# Patient Record
Sex: FEMALE | Race: WHITE | Employment: FULL TIME | URBAN - METROPOLITAN AREA
[De-identification: names, ages, dates, MRNs, and addresses within clinical notes are randomized per-mention and may not be internally consistent; named-entity substitution may affect disease eponyms.]

---

## 2017-06-08 ENCOUNTER — HOSPITAL ENCOUNTER (EMERGENCY)
Facility: HOSPITAL | Age: 32
Discharge: HOME | End: 2017-06-08
Attending: EMERGENCY MEDICINE
Payer: COMMERCIAL

## 2017-06-08 ENCOUNTER — APPOINTMENT (OUTPATIENT)
Dept: ULTRASOUND IMAGING | Facility: HOSPITAL | Age: 32
End: 2017-06-08
Attending: EMERGENCY MEDICINE
Payer: COMMERCIAL

## 2017-06-08 VITALS
HEART RATE: 88 BPM | SYSTOLIC BLOOD PRESSURE: 118 MMHG | TEMPERATURE: 98 F | BODY MASS INDEX: 19.97 KG/M2 | RESPIRATION RATE: 18 BRPM | HEIGHT: 64 IN | WEIGHT: 117 LBS | DIASTOLIC BLOOD PRESSURE: 76 MMHG | OXYGEN SATURATION: 100 %

## 2017-06-08 DIAGNOSIS — R60.0 EXTREMITY EDEMA: Primary | ICD-10-CM

## 2017-06-08 PROCEDURE — 99284 EMERGENCY DEPT VISIT MOD MDM: CPT

## 2017-06-08 PROCEDURE — 93971 EXTREMITY STUDY: CPT | Performed by: EMERGENCY MEDICINE

## 2017-06-08 RX ORDER — IBUPROFEN 200 MG
200 TABLET ORAL EVERY 6 HOURS PRN
COMMUNITY
End: 2020-03-02 | Stop reason: ALTCHOICE

## 2017-06-08 NOTE — ED NOTES
Patient back in room, patient in stable condition, provided with warm blanket. Sig other at bedside.

## 2017-06-08 NOTE — ED INITIAL ASSESSMENT (HPI)
Patient sent by her OB/GYNE to r/o DVT to right leg. Reports swelling to right ankle x1 month as well as aching pain to knee. Reports intermittent cramping to left calf. Reports worsening aching pain with standing.  Denies injury/ trauma

## 2017-06-08 NOTE — ED PROVIDER NOTES
Patient Seen in: [facility] Emergency Department    History   Patient presents with:  Lower Extremity Injury (musculoskeletal)    Stated Complaint: r/o DVT, sent by gyne    MARJORIE    This is a 28-year-old female coming with complaints of leg swelling. SpO2 100%  LMP 06/07/2017 (Exact Date)        Physical Exam  Generally the patient is alert and oriented ×3 and appears in no distress  HEENT exam normal  Lungs are clear to auscultation bilaterally with no wheezes retractions or rhonchi noted  Cardiovascu

## 2018-05-05 ENCOUNTER — HOSPITAL ENCOUNTER (EMERGENCY)
Facility: HOSPITAL | Age: 33
Discharge: HOME | End: 2018-05-05
Attending: EMERGENCY MEDICINE
Payer: COMMERCIAL

## 2018-05-05 VITALS
DIASTOLIC BLOOD PRESSURE: 91 MMHG | RESPIRATION RATE: 18 BRPM | HEART RATE: 84 BPM | TEMPERATURE: 98 F | WEIGHT: 125 LBS | SYSTOLIC BLOOD PRESSURE: 121 MMHG | HEIGHT: 64 IN | BODY MASS INDEX: 21.34 KG/M2

## 2018-05-05 DIAGNOSIS — S05.01XA ABRASION OF RIGHT CORNEA, INITIAL ENCOUNTER: Primary | ICD-10-CM

## 2018-05-05 PROCEDURE — 99283 EMERGENCY DEPT VISIT LOW MDM: CPT

## 2018-05-05 RX ORDER — TRAMADOL HYDROCHLORIDE 50 MG/1
TABLET ORAL EVERY 4 HOURS PRN
Qty: 10 TABLET | Refills: 0 | Status: SHIPPED | OUTPATIENT
Start: 2018-05-05 | End: 2018-05-12

## 2018-05-05 RX ORDER — TETRACAINE HYDROCHLORIDE 5 MG/ML
2 SOLUTION OPHTHALMIC ONCE
Status: COMPLETED | OUTPATIENT
Start: 2018-05-05 | End: 2018-05-05

## 2018-05-05 RX ORDER — CIPROFLOXACIN HYDROCHLORIDE 3.5 MG/ML
2 SOLUTION/ DROPS TOPICAL
Qty: 1 BOTTLE | Refills: 0 | Status: SHIPPED | OUTPATIENT
Start: 2018-05-05 | End: 2018-05-15

## 2018-05-06 NOTE — ED PROVIDER NOTES
Patient Seen in: [facility] Emergency Department    History   Patient presents with: Eye Visual Problem (opthalmic)    Stated Complaint: eye pain    HPI    59-year-old female presents for evaluation of right eye pain, redness and tearing.   All symp ED Course as of May 05 1922  ------------------------------------------------------------      MDM       After topical tetracaine, patient stated her visual acuity was normal.  She felt much better. Corneal abrasion seen at 12:00.   This is likely related

## 2020-03-02 ENCOUNTER — HOSPITAL ENCOUNTER (OUTPATIENT)
Dept: MAMMOGRAPHY | Facility: HOSPITAL | Age: 35
Discharge: HOME | End: 2020-03-02
Attending: OBSTETRICS & GYNECOLOGY
Payer: COMMERCIAL

## 2020-03-02 ENCOUNTER — OFFICE VISIT (OUTPATIENT)
Dept: FAMILY MEDICINE CLINIC | Facility: CLINIC | Age: 35
End: 2020-03-02
Payer: COMMERCIAL

## 2020-03-02 VITALS
HEIGHT: 63 IN | SYSTOLIC BLOOD PRESSURE: 106 MMHG | HEART RATE: 105 BPM | OXYGEN SATURATION: 97 % | BODY MASS INDEX: 22.5 KG/M2 | TEMPERATURE: 97 F | WEIGHT: 127 LBS | RESPIRATION RATE: 16 BRPM | DIASTOLIC BLOOD PRESSURE: 62 MMHG

## 2020-03-02 DIAGNOSIS — Z00.00 LABORATORY EXAMINATION ORDERED AS PART OF A ROUTINE GENERAL MEDICAL EXAMINATION: ICD-10-CM

## 2020-03-02 DIAGNOSIS — Z00.00 PHYSICAL EXAM, ANNUAL: Primary | ICD-10-CM

## 2020-03-02 DIAGNOSIS — Z13.89 SCREENING FOR GENITOURINARY CONDITION: ICD-10-CM

## 2020-03-02 DIAGNOSIS — Z80.3 FAMILY HISTORY OF MALIGNANT NEOPLASM OF BREAST: ICD-10-CM

## 2020-03-02 DIAGNOSIS — R00.0 TACHYCARDIA: ICD-10-CM

## 2020-03-02 PROCEDURE — 77066 DX MAMMO INCL CAD BI: CPT | Performed by: OBSTETRICS & GYNECOLOGY

## 2020-03-02 PROCEDURE — 99385 PREV VISIT NEW AGE 18-39: CPT | Performed by: FAMILY MEDICINE

## 2020-03-02 PROCEDURE — 77062 BREAST TOMOSYNTHESIS BI: CPT | Performed by: OBSTETRICS & GYNECOLOGY

## 2020-03-02 NOTE — PROGRESS NOTES
HPI:   Naz Arellano is a 28year old female who presents for a complete physical exam. Symptoms: denies discharge, itching, burning or dysuria. Patient complains of some back problems related to her scoliosis.   Since her last visit in 2012 she had 2 pr yes Children: 3. Exercise: three times per week.   Diet: watches calories closely     REVIEW OF SYSTEMS:   GENERAL: feels well otherwise  SKIN: denies any unusual skin lesions  EYES:denies blurred vision or double vision  HEENT: denies nasal congestion, s CBC W/DIFF      COMP METABOLIC PANEL      LIPID PANEL      TSH W REFLEX TO FREE T4      UA/M with Culture Reflex      Magnesium [E]      Meds & Refills for this Visit:  Requested Prescriptions      No prescriptions requested or ordered in this encounter

## 2020-03-02 NOTE — PATIENT INSTRUCTIONS
Healthy diet. Stay active. Return for fasting blood work. Do some back stretching exercise if that would not work return for reevaluation.

## 2024-05-11 ENCOUNTER — APPOINTMENT (OUTPATIENT)
Dept: ULTRASOUND IMAGING | Facility: HOSPITAL | Age: 39
End: 2024-05-11
Attending: EMERGENCY MEDICINE

## 2024-05-11 ENCOUNTER — HOSPITAL ENCOUNTER (EMERGENCY)
Facility: HOSPITAL | Age: 39
Discharge: HOME | End: 2024-05-11
Attending: EMERGENCY MEDICINE

## 2024-05-11 VITALS
HEART RATE: 85 BPM | BODY MASS INDEX: 22.02 KG/M2 | HEIGHT: 64 IN | SYSTOLIC BLOOD PRESSURE: 123 MMHG | WEIGHT: 129 LBS | RESPIRATION RATE: 18 BRPM | TEMPERATURE: 98 F | DIASTOLIC BLOOD PRESSURE: 83 MMHG | OXYGEN SATURATION: 100 %

## 2024-05-11 DIAGNOSIS — N93.8 DYSFUNCTIONAL UTERINE BLEEDING: Primary | ICD-10-CM

## 2024-05-11 LAB
ANTIBODY SCREEN: NEGATIVE
B-HCG UR QL: NEGATIVE
BASOPHILS # BLD AUTO: 0.04 X10(3) UL (ref 0–0.2)
BASOPHILS NFR BLD AUTO: 0.8 %
EOSINOPHIL # BLD AUTO: 0.16 X10(3) UL (ref 0–0.7)
EOSINOPHIL NFR BLD AUTO: 3.3 %
ERYTHROCYTE [DISTWIDTH] IN BLOOD BY AUTOMATED COUNT: 12.9 %
HCT VFR BLD AUTO: 39.9 %
HGB BLD-MCNC: 12.7 G/DL
IMM GRANULOCYTES # BLD AUTO: 0.01 X10(3) UL (ref 0–1)
IMM GRANULOCYTES NFR BLD: 0.2 %
LYMPHOCYTES # BLD AUTO: 1.76 X10(3) UL (ref 1–4)
LYMPHOCYTES NFR BLD AUTO: 35.8 %
MCH RBC QN AUTO: 26.8 PG (ref 26–34)
MCHC RBC AUTO-ENTMCNC: 31.8 G/DL (ref 31–37)
MCV RBC AUTO: 84.2 FL
MONOCYTES # BLD AUTO: 0.4 X10(3) UL (ref 0.1–1)
MONOCYTES NFR BLD AUTO: 8.1 %
NEUTROPHILS # BLD AUTO: 2.55 X10 (3) UL (ref 1.5–7.7)
NEUTROPHILS # BLD AUTO: 2.55 X10(3) UL (ref 1.5–7.7)
NEUTROPHILS NFR BLD AUTO: 51.8 %
PLATELET # BLD AUTO: 310 10(3)UL (ref 150–450)
RBC # BLD AUTO: 4.74 X10(6)UL
RH BLOOD TYPE: POSITIVE
RH BLOOD TYPE: POSITIVE
WBC # BLD AUTO: 4.9 X10(3) UL (ref 4–11)

## 2024-05-11 PROCEDURE — 86900 BLOOD TYPING SEROLOGIC ABO: CPT | Performed by: EMERGENCY MEDICINE

## 2024-05-11 PROCEDURE — 85025 COMPLETE CBC W/AUTO DIFF WBC: CPT | Performed by: EMERGENCY MEDICINE

## 2024-05-11 PROCEDURE — 86850 RBC ANTIBODY SCREEN: CPT | Performed by: EMERGENCY MEDICINE

## 2024-05-11 PROCEDURE — 99285 EMERGENCY DEPT VISIT HI MDM: CPT

## 2024-05-11 PROCEDURE — 36415 COLL VENOUS BLD VENIPUNCTURE: CPT

## 2024-05-11 PROCEDURE — 86901 BLOOD TYPING SEROLOGIC RH(D): CPT | Performed by: EMERGENCY MEDICINE

## 2024-05-11 PROCEDURE — 81025 URINE PREGNANCY TEST: CPT

## 2024-05-11 PROCEDURE — 76856 US EXAM PELVIC COMPLETE: CPT | Performed by: EMERGENCY MEDICINE

## 2024-05-11 PROCEDURE — 93975 VASCULAR STUDY: CPT | Performed by: EMERGENCY MEDICINE

## 2024-05-11 PROCEDURE — 76830 TRANSVAGINAL US NON-OB: CPT | Performed by: EMERGENCY MEDICINE

## 2024-05-11 RX ORDER — PROGESTERONE 200 MG/1
200 CAPSULE ORAL NIGHTLY
Qty: 14 CAPSULE | Refills: 0 | Status: SHIPPED | OUTPATIENT
Start: 2024-05-11 | End: 2024-05-25

## 2024-05-11 RX ORDER — SUCRALFATE 1 G/1
1 TABLET ORAL
COMMUNITY

## 2024-05-11 NOTE — ED PROVIDER NOTES
Patient Seen in: [facility] Emergency Department      History     Chief Complaint   Patient presents with    Eval-G     Stated Complaint: heavy vaginal bleeding with clots    Subjective:   HPI    39-year-old with a history of GERD, prior appendectomy presents for evaluation of abnormal uterine bleeding.  Patient started her period , which was expected. However, she has been bleeding ever since. She called her GYN about 2 weeks in, and was prescribed 10 days of progesterone however after she finished the course, her bleeding has now worsened and is heavier with clots. No abdominal pain today, had some menstrual cramping the last few days. No nausea or vomiting. No shortness of breath.   Has paraguard IUD  GYN: Dr. Iron Sibley Sub  Records reviewed.  Had pelvic ultrasound ordered , not yet performed.  CBC with hemoglobin 13.0 on 3/4.  Objective:   Past Medical History:    Esophageal reflux    Scoliosis              Past Surgical History:   Procedure Laterality Date    Adenoidectomy      Appendectomy  90          x2    Tonsillectomy  4                Social History     Socioeconomic History    Marital status:    Tobacco Use    Smoking status: Never    Smokeless tobacco: Never   Vaping Use    Vaping status: Never Used   Substance and Sexual Activity    Alcohol use: Yes     Comment: social, once every 2-3 weeks    Drug use: No    Sexual activity: Yes     Partners: Male   Other Topics Concern    Caffeine Concern No     Comment: 2-3x per wk    Exercise No     Comment: 1 per wk    Seat Belt Yes              Review of Systems    Positive for stated complaint: heavy vaginal bleeding with clots  Other systems are as noted in HPI.  Constitutional and vital signs reviewed.      All other systems reviewed and negative except as noted above.    Physical Exam     ED Triage Vitals [24 1117]   /87   Pulse 96   Resp 18   Temp 98.3 °F (36.8 °C)   Temp src    SpO2 100 %   O2 Device         Current Vitals:   Vital Signs  BP: 120/87  Pulse: 96  Resp: 18  Temp: 98.3 °F (36.8 °C)    Oxygen Therapy  SpO2: 100 %            Physical Exam    General: Patient is awake, alert in no acute distress.   HEENT:  Sclera are not icteric.  Conjunctivae within normal limits.  Mucous members are moist.   Cardiovascular: Regular rate and rhythm, normal S1-S2.  Respiratory: Lungs are clear to auscultation bilaterally.   Abdomen: Soft, nontender, nondistended.  : Normal external female genitalia.  Small amount of dark red blood in the vaginal vault.  Extremities: No edema.  Skin: warm and dry, no diaphoresis    ED Course     Labs Reviewed   CBC WITH DIFFERENTIAL WITH PLATELET    Narrative:     The following orders were created for panel order CBC With Differential With Platelet.  Procedure                               Abnormality         Status                     ---------                               -----------         ------                     CBC W/ DIFFERENTIAL[[id number]]                              Final result                 Please view results for these tests on the individual orders.   TYPE AND SCREEN    Narrative:     The following orders were created for panel order Type and screen.  Procedure                               Abnormality         Status                     ---------                               -----------         ------                     ABORH (Blood Type)[[id number]]                               Final result               Antibody Screen[[id number]]                                  Final result                 Please view results for these tests on the individual orders.   ABORH (BLOOD TYPE)   ANTIBODY SCREEN   ABORH CONFIRMATION   CBC W/ DIFFERENTIAL     Pregnancy test negative  Ultrasound pelvis: I personally reviewed the radiographs and my individual interpretation shows IUD noted within the uterus.  I also reviewed the official report which showed no acute process.  IUD in  place.  2.4 cm left ovarian cyst.                 MDM        Previous records reviewed as noted in HPI    Differential includes, but is not limited to, hemorrhagic shock, ruptured ectopic pregnancy, dysfunctional uterine bleeding    Review of any laboratory testing: CBC with normal hemoglobin, 12.7.     Review of any radiographic studies: Ultrasound unremarkable    Shared decision making with the patient.  Hemoglobin stable.  Ultrasound reassuring.  Patient can be discharged home for close outpatient follow-up with gynecology for more definitive intervention.    Consultants utilized: I spoke with the on-call physician for Dr. Perdue, who recommended prescribing 2 weeks of Prometrium while patient arranges follow-up.    Patient states that she was told they would discuss a possible appointment once her ultrasound was complete.  She did ask for local referrals in case her gynecologist is unable to accommodate her.    Patient should return for new or worsening symptoms such as heavier bleeding, lightheadedness or shortness of breath    OTC meds/Rx meds: Prometrium                                Medical Decision Making      Disposition and Plan     Clinical Impression:  1. Dysfunctional uterine bleeding         Disposition:  Discharge  5/11/2024  4:22 pm    Follow-up:  Bebe Loera MD  [address]  [place] [address]  [phone]    Schedule an appointment as soon as possible for a visit in 1 week(s)      Princess Smith MD  [address] [facility]  [place] [address]  [phone]    Schedule an appointment as soon as possible for a visit in 1 week(s)      Philomena Pollard MD  [address] [facility] [address] [facility] [address]  [place] [address]  [phone]    Schedule an appointment as soon as possible for a visit in 1 week(s)            Medications Prescribed:  Current Discharge Medication List        START taking these medications    Details   progesterone (PROMETRIUM) 200 MG Oral Cap Take 1 capsule  (200 mg total) by mouth nightly for 14 days.  Qty: 14 capsule, Refills: 0

## 2024-05-11 NOTE — DISCHARGE INSTRUCTIONS
Return for new or worsening symptoms such as heavier bleeding, lightheadedness, shortness of breath    If your gynecologist cannot accommodate you, you can try following up with one of the local gynecologists on this paperwork

## 2024-05-11 NOTE — ED INITIAL ASSESSMENT (HPI)
C/o of heavy vaginal bleeding has been on going since last month. With clots. going through 1pad/hr over the last 24hrs.   With intermittent LLQ pain

## 2024-05-23 ENCOUNTER — OFFICE VISIT (OUTPATIENT)
Dept: OBGYN CLINIC | Facility: CLINIC | Age: 39
End: 2024-05-23

## 2024-05-23 VITALS
WEIGHT: 130.06 LBS | DIASTOLIC BLOOD PRESSURE: 79 MMHG | HEIGHT: 64 IN | BODY MASS INDEX: 22.2 KG/M2 | HEART RATE: 97 BPM | SYSTOLIC BLOOD PRESSURE: 116 MMHG

## 2024-05-23 DIAGNOSIS — N92.1 MENORRHAGIA WITH IRREGULAR CYCLE: ICD-10-CM

## 2024-05-23 DIAGNOSIS — N93.8 DUB (DYSFUNCTIONAL UTERINE BLEEDING): Primary | ICD-10-CM

## 2024-05-23 DIAGNOSIS — N92.1 PROLONGED MENSTRUATION: ICD-10-CM

## 2024-05-23 DIAGNOSIS — Z91.89 AT HIGH RISK FOR BREAST CANCER: ICD-10-CM

## 2024-05-23 LAB
CONTROL LINE PRESENT WITH A CLEAR BACKGROUND (YES/NO): YES YES/NO
KIT LOT #: NORMAL NUMERIC
PREGNANCY TEST, URINE: NEGATIVE

## 2024-05-23 PROCEDURE — 3078F DIAST BP <80 MM HG: CPT | Performed by: STUDENT IN AN ORGANIZED HEALTH CARE EDUCATION/TRAINING PROGRAM

## 2024-05-23 PROCEDURE — 3074F SYST BP LT 130 MM HG: CPT | Performed by: STUDENT IN AN ORGANIZED HEALTH CARE EDUCATION/TRAINING PROGRAM

## 2024-05-23 PROCEDURE — 81025 URINE PREGNANCY TEST: CPT | Performed by: STUDENT IN AN ORGANIZED HEALTH CARE EDUCATION/TRAINING PROGRAM

## 2024-05-23 PROCEDURE — 3008F BODY MASS INDEX DOCD: CPT | Performed by: STUDENT IN AN ORGANIZED HEALTH CARE EDUCATION/TRAINING PROGRAM

## 2024-05-23 PROCEDURE — 99204 OFFICE O/P NEW MOD 45 MIN: CPT | Performed by: STUDENT IN AN ORGANIZED HEALTH CARE EDUCATION/TRAINING PROGRAM

## 2024-05-23 RX ORDER — MEDROXYPROGESTERONE ACETATE 10 MG/1
TABLET ORAL
Qty: 65 TABLET | Refills: 0 | Status: SHIPPED | OUTPATIENT
Start: 2024-05-23

## 2024-05-23 NOTE — PROGRESS NOTES
[facility]  Obstetrics and Gynecology   History & Physical    Chief complaint:   Chief Complaint   Patient presents with    New Patient    ER F/U    Vaginal Bleeding        HPI: Lisette Watson is a 39 year old  with Patient's last menstrual period was 2024 (exact date).      Pt normally has regular menses q28d, not too heavy (will change pad or tampon q3-5h on heaviest day)  Has had paragard IUD for 8y and no issues with menses  In October noted spotting around CD21 sometimes, then would get a normal period CD28  In April she started having light bleeding, then got heavier like a normal period and didn't stop for a month. Saw an OBGYN in area and took 10d of prometrium 200mg. That never stopped the bleeding, then once she finished the prometrium bleeding got very heavy, bled through both a pad and tampon in less than 2hours so went to ED  US in ED 24 was unremarkable, Hgb 12.7  Bleeding then stopped 2d later    In Care Everywhere there is diagnosis of PCOS from years ago - pt used to have irregular menses and did infertility tx but after pregnancy have been very regular    Hx Prior Abnormal Pap: Yes  Pap Date:  ()  Pap Result Notes: negative    PMHx/Surghx reviewed, below. Of note: GERD, migraines  Famhx: maternal grandmother had breast cancer in her 30s - pt risk score for breast cancer is high risk so she has been seeing breast specialist through [employer], gets mammo & MRI alternating q 6 mo  She did have genetic testing which was NEGATIVE for breast cancer genes  No family hx uterine cancer  Grandfather had colon cancer (later in life, early stage)    PCP: Lisa Sarkar MD    Review of Systems:  Constitutional:  Denies fatigue, night sweats, hot flashes  Eyes:  denies blurred or double vision  Cardiovascular:  denies chest pain or palpitations  Respiratory:  denies shortness of breath  Gastrointestinal:  denies abdominal pain, diarrhea or constipation  Genitourinary:  denies  dysuria, incontinence, abnormal vaginal discharge, vaginal itching  Musculoskeletal:  denies back pain.  Skin/Breast:  Denies any breast pain, lumps, or discharge.   Neurological:  denies headaches, extremity weakness or numbness.  Psychiatric: denies depression or anxiety.  Endocrine:   denies excessive thirst or urination.  Heme/Lymph:  denies history of anemia, easy bruising or bleeding.    OB History:  OB History    Para Term  AB Living   3 2 2   1 3   SAB IAB Ectopic Multiple Live Births   1     1 3      # Outcome Date GA Lbr Hong/2nd Weight Sex Type Anes PTL Lv   3 Term 2016    M NORMAL SPONT   HARIS   2A Term 2013    M NORMAL SPONT   HARIS   2B Term 2013    F NORMAL SPONT   HARIS   1 SAB                Meds:  Current Outpatient Medications on File Prior to Visit   Medication Sig Dispense Refill    sucralfate 1 g Oral Tab Take 1 tablet (1 g total) by mouth 4 (four) times daily before meals and nightly.      progesterone (PROMETRIUM) 200 MG Oral Cap Take 1 capsule (200 mg total) by mouth nightly for 14 days. 14 capsule 0    Propranolol HCl 20 MG Oral Tab Take one pill 30-60 minutes prior to event. 30 tablet 0     No current facility-administered medications on file prior to visit.       All:  No Known Allergies    PMH:  Past Medical History:    Esophageal reflux    Scoliosis       PSH:  Past Surgical History:   Procedure Laterality Date    Adenoidectomy      Appendectomy      Colposcopy, cervix w/upper adjacent vagina; w/biopsy(s), cervix            x2    Tonsillectomy  2004       Social History:  Social History     Socioeconomic History    Marital status:      Spouse name: Not on file    Number of children: Not on file    Years of education: Not on file    Highest education level: Not on file   Occupational History    Not on file   Tobacco Use    Smoking status: Never    Smokeless tobacco: Never   Vaping Use    Vaping status: Never Used   Substance and Sexual Activity    Alcohol use:  Yes     Comment: social, once every 2-3 weeks    Drug use: No    Sexual activity: Yes     Partners: Male   Other Topics Concern     Service Not Asked    Blood Transfusions Not Asked    Caffeine Concern No     Comment: 2-3x per wk    Occupational Exposure Not Asked    Hobby Hazards Not Asked    Sleep Concern Not Asked    Stress Concern Not Asked    Weight Concern Not Asked    Special Diet Not Asked    Back Care Not Asked    Exercise No     Comment: 1 per wk    Bike Helmet Not Asked    Seat Belt Yes    Self-Exams Not Asked   Social History Narrative    Not on file     Social Determinants of Health     Financial Resource Strain: Not on file   Food Insecurity: Not on file   Transportation Needs: Not on file   Physical Activity: Not on file   Stress: Not on file   Social Connections: Not on file   Housing Stability: Not on file        Family History:  Family History   Problem Relation Age of Onset    Heart Disorder Paternal Grandfather         MI    Other (acute MI) Paternal Grandfather     Diabetes Maternal Grandfather     Heart Disorder Maternal Grandfather     Cancer Maternal Grandfather         colon ca    Other (artherosclerosis) Maternal Grandfather     Breast Cancer Maternal Grandmother 34    Cancer Maternal Grandmother         breast ca    Hypertension Father     Heart Disorder Father         CAD s/p stent    Lipids Mother     Other (Fibromyalgia) Mother        PHYSICAL EXAM:     Vitals:    05/23/24 1548   BP: 116/79   Pulse: 97   Weight: 130 lb 1.1 oz (59 kg)   Height: 64\"       Body mass index is 22.33 kg/m².      Constitutional: well developed, well nourished  Head/Face: normocephalic  Skin/Hair: no unusual rashes or bruises  Extremities: no edema, no cyanosis  Psychiatric:  Oriented to time, place, person and situation. Appropriate mood and affect    Pelvic Exam:  External Genitalia: normal appearance, hair distribution, and no lesions  Urethral Meatus:  normal in size, location, without lesions and  prolapse  Bladder:  No fullness, masses or tenderness  Vagina:  Normal appearance without lesions, no abnormal discharge  Cervix:  Normal without tenderness on motion  Uterus: normal in size, contour, position, mobility, without tenderness  Adnexa: normal without masses or tenderness  Perineum: normal  Anus: no hemorrhoids     Assessment & Plan:     Lisette Watson is a 39 year old      Diagnoses and all orders for this visit:    DUB (dysfunctional uterine bleeding)  -     Urine Preg Test [[id number]]  -     medroxyPROGESTERone Acetate 10 MG Oral Tab; Start taking if having another episode of heavy or prolonged bleeding. 1 tab PO TID for 7 days, then 1 tab PO BID x 7 days, then 1 tab PO daily for 30 days.    Prolonged menstruation  -     medroxyPROGESTERone Acetate 10 MG Oral Tab; Start taking if having another episode of heavy or prolonged bleeding. 1 tab PO TID for 7 days, then 1 tab PO BID x 7 days, then 1 tab PO daily for 30 days.  -     Dehydroepiandrosterone Sulfate; Future  -     Estradiol; Future  -     FSH; Future  -     Hemoglobin A1C; Future  -     Prolactin; Future  -     Testosterone,Total and Weakly Bound w/ SHBG; Future  -     TSH and Free T4; Future  -     17-OH-Progesterone; Future    Menorrhagia with irregular cycle  -     medroxyPROGESTERone Acetate 10 MG Oral Tab; Start taking if having another episode of heavy or prolonged bleeding. 1 tab PO TID for 7 days, then 1 tab PO BID x 7 days, then 1 tab PO daily for 30 days.    At high risk for breast cancer  -     Surg Onc Referral - [facility] ([place])       Will check hormone levels  Reassuring that ultrasound was normal  Pt rxed provera taper in case she has another heavy or prolonged bleeding episode - she is traveling for two weeks and would like to have just in case. D/w pt does not need to take if no issues with bleeding  If continues to have heavy or prolonged periods would recommend EMB, discussed briefly today  She wants to avoid hormones  long term because of her breast cancer risk  Looking into switching care to [facility] due to long wait times for breast imaging with [facility], given referral    Philomena Pollard MD  [facility] - OBGYN

## 2024-09-23 ENCOUNTER — LAB ENCOUNTER (OUTPATIENT)
Dept: LAB | Age: 39
End: 2024-09-23
Attending: STUDENT IN AN ORGANIZED HEALTH CARE EDUCATION/TRAINING PROGRAM
Payer: COMMERCIAL

## 2024-09-23 DIAGNOSIS — N92.1 PROLONGED MENSTRUATION: ICD-10-CM

## 2024-09-23 LAB
DHEA-S SERPL-MCNC: 131.9 UG/DL
EST. AVERAGE GLUCOSE BLD GHB EST-MCNC: 103 MG/DL (ref 68–126)
ESTRADIOL SERPL-MCNC: 68.3 PG/ML
FSH SERPL-ACNC: 17 MIU/ML
HBA1C MFR BLD: 5.2 % (ref ?–5.7)
PROLACTIN SERPL-MCNC: 8.6 NG/ML
T4 FREE SERPL-MCNC: 1.2 NG/DL (ref 0.8–1.7)
TSI SER-ACNC: 1.27 MIU/ML (ref 0.55–4.78)

## 2024-09-23 PROCEDURE — 83036 HEMOGLOBIN GLYCOSYLATED A1C: CPT | Performed by: STUDENT IN AN ORGANIZED HEALTH CARE EDUCATION/TRAINING PROGRAM

## 2024-09-23 PROCEDURE — 84410 TESTOSTERONE BIOAVAILABLE: CPT | Performed by: STUDENT IN AN ORGANIZED HEALTH CARE EDUCATION/TRAINING PROGRAM

## 2024-09-23 PROCEDURE — 84146 ASSAY OF PROLACTIN: CPT | Performed by: STUDENT IN AN ORGANIZED HEALTH CARE EDUCATION/TRAINING PROGRAM

## 2024-09-23 PROCEDURE — 84143 ASSAY OF 17-HYDROXYPREGNENO: CPT | Performed by: STUDENT IN AN ORGANIZED HEALTH CARE EDUCATION/TRAINING PROGRAM

## 2024-09-23 PROCEDURE — 84439 ASSAY OF FREE THYROXINE: CPT | Performed by: STUDENT IN AN ORGANIZED HEALTH CARE EDUCATION/TRAINING PROGRAM

## 2024-09-23 PROCEDURE — 84443 ASSAY THYROID STIM HORMONE: CPT | Performed by: STUDENT IN AN ORGANIZED HEALTH CARE EDUCATION/TRAINING PROGRAM

## 2024-09-23 PROCEDURE — 82627 DEHYDROEPIANDROSTERONE: CPT | Performed by: STUDENT IN AN ORGANIZED HEALTH CARE EDUCATION/TRAINING PROGRAM

## 2024-09-23 PROCEDURE — 82670 ASSAY OF TOTAL ESTRADIOL: CPT | Performed by: STUDENT IN AN ORGANIZED HEALTH CARE EDUCATION/TRAINING PROGRAM

## 2024-09-23 PROCEDURE — 83001 ASSAY OF GONADOTROPIN (FSH): CPT | Performed by: STUDENT IN AN ORGANIZED HEALTH CARE EDUCATION/TRAINING PROGRAM

## 2024-09-26 LAB — 17-OH PROGESTERONE: 143 NG/DL

## 2024-09-28 LAB
SEX HORM BIND GLOB: 155 NMOL/L
TESTOST % FREE+WEAK BND: 7.6 %
TESTOST FREE+WEAK BND: 2 NG/DL
TESTOSTERONE TOT /MS: 25.7 NG/DL

## 2024-12-17 ENCOUNTER — OFFICE VISIT (OUTPATIENT)
Dept: SURGERY | Facility: CLINIC | Age: 39
End: 2024-12-17
Payer: COMMERCIAL

## 2024-12-17 VITALS
BODY MASS INDEX: 22 KG/M2 | SYSTOLIC BLOOD PRESSURE: 138 MMHG | DIASTOLIC BLOOD PRESSURE: 86 MMHG | WEIGHT: 131 LBS | OXYGEN SATURATION: 100 % | RESPIRATION RATE: 18 BRPM | HEART RATE: 108 BPM

## 2024-12-17 DIAGNOSIS — Z12.31 ENCOUNTER FOR SCREENING MAMMOGRAM FOR MALIGNANT NEOPLASM OF BREAST: ICD-10-CM

## 2024-12-17 DIAGNOSIS — R92.333 HETEROGENEOUSLY DENSE TISSUE OF BOTH BREASTS ON MAMMOGRAPHY: Primary | ICD-10-CM

## 2024-12-17 PROCEDURE — 3075F SYST BP GE 130 - 139MM HG: CPT | Performed by: SURGERY

## 2024-12-17 PROCEDURE — 99205 OFFICE O/P NEW HI 60 MIN: CPT | Performed by: SURGERY

## 2024-12-17 PROCEDURE — 3079F DIAST BP 80-89 MM HG: CPT | Performed by: SURGERY

## 2024-12-17 NOTE — CONSULTS
Breast Surgery New Patient Consultation - High Risk Clinic    Lisette Watson is a 39 year old patient, referred by Dr. Sarkar, who presents for evaluation of breast cancer risk.    History of Present Illness:   Ms. Lisette Watson is a 39 year old woman with a past history significant for Ashkenazi Shinto heritage who presents for evaluation of breast cancer risk. She denies any palpable masses, nipple discharge, skin changes, or axillary adenopathy. She does not have breast pain. She does have family history of breast cancer. Her maternal grandmother had breast cancer at age 34. The patient does have a history of genetic testing in  and was negative for pathogenic variants. Her mother also had genetic testing that was negative.    She had a breast MRI on 2023 for screening.  There was a new enhancement in the right breast measuring 12 x 7 x 6 mm.  This area was not reproduced upon trying for biopsy on 2023 and a 6-month follow-up was recommended.  Of note this had no sonographic correlate on ultrasound.  MRI was repeated on 5/15/2024 and the region of enhancement of the right breast was again seen, unchanged.  This was felt to be benign and routine breast MRI was recommended in 1 year.  Bilateral screening mammogram was performed on 6/10/2024, this showed density C breast tissue.  There was a left breast asymmetry.  Diagnostic imaging on 2024 showed that this asymmetry dissipated upon compression films and next mammogram was recommended in 1 year. Due to persistence of palpable abnormality, she underwent ultrasound guided biopsy of a left breast mass at 2:00, 5 cm from the nipple on 7/3/2024. This showed PASH.           Past Medical History:    Esophageal reflux    Scoliosis       Past Surgical History:   Procedure Laterality Date    Adenoidectomy      Appendectomy      Colposcopy, cervix w/upper adjacent vagina; w/biopsy(s), cervix            x2    Tonsillectomy          Gynecological History:  Menarche at age 13 and LMP 12/3/24  Pt is a   Pt was 28 years old at time of first pregnancy.    She has cumulative breastfeeding history of 33 months.  Age of Menopause: n/a  Type: n/a  She denies any history of hormone replacement therapy  She has history of oral contraceptive use for 6 years, last .   She has recieved infertility treatment to achieve pregnancy.    Medications:     Propranolol HCl 20 MG Oral Tab Take one pill 30-60 minutes prior to event. 30 tablet 0       Allergies:    Allergies[1]    Family History:   Family History   Problem Relation Age of Onset    Heart Disorder Paternal Grandfather         MI    Other (acute MI) Paternal Grandfather     Diabetes Maternal Grandfather     Heart Disorder Maternal Grandfather     Cancer Maternal Grandfather         colon ca    Other (artherosclerosis) Maternal Grandfather     Breast Cancer Maternal Grandmother 34    Cancer Maternal Grandmother         breast ca    Hypertension Father     Heart Disorder Father         CAD s/p stent    Lipids Mother     Other (Fibromyalgia) Mother        She is of Ashkenazi Gn[employer]ist ancestry.    Social History:  History   Alcohol Use    Yes     Comment: social, once every 2-3 weeks       History   Smoking Status    Never   Smokeless Tobacco    Never       Review of Systems:    Review of Systems   Constitutional:  Negative for chills and fever.   HENT:  Negative for sore throat and trouble swallowing.    Eyes:  Negative for visual disturbance.   Respiratory:  Negative for cough, chest tightness and shortness of breath.    Cardiovascular:  Negative for chest pain, palpitations and leg swelling.   Gastrointestinal:  Negative for nausea, vomiting, abdominal pain, diarrhea, constipation and blood in stool.   Genitourinary:  Negative for dysuria, hematuria and difficulty urinating.   Skin:  Negative for rash.   Neurological:  Negative for numbness and headaches.      Otherwise as per HPI.    Physical  Exam:    /86   Pulse 108   Resp 18   Wt 59.4 kg (131 lb)   LMP 04/14/2024 (Exact Date)   SpO2 100%   BMI 22.49 kg/m²     Physical Exam  Vitals reviewed.   Constitutional:       Appearance: Normal appearance.   HENT:      Head: Normocephalic and atraumatic.   Eyes:      Extraocular Movements: Extraocular movements intact.      Pupils: Pupils are equal, round, and reactive to light.   Cardiovascular:      Rate and Rhythm: Normal rate.   Pulmonary:      Effort: Pulmonary effort is normal.   Chest:   Breasts:     Right: Normal. No mass, nipple discharge, skin change or tenderness.      Left: Normal. No mass, nipple discharge, skin change or tenderness.       Abdominal:      General: Abdomen is flat.      Palpations: Abdomen is soft.   Musculoskeletal:         General: Normal range of motion.      Cervical back: Normal range of motion and neck supple.   Lymphadenopathy:      Upper Body:      Right upper body: No supraclavicular or axillary adenopathy.      Left upper body: No supraclavicular or axillary adenopathy.   Skin:     General: Skin is warm and dry.   Neurological:      General: No focal deficit present.      Mental Status: She is alert and oriented to person, place, and time.   Psychiatric:         Mood and Affect: Mood normal.            Labs/imaging: reviewed in EPIC    Impression:   Ms. Lisette Watson is a 39 year old woman presents for high risk of breast cancer    Discussion and Plan:  I had a discussion with the Patient regarding her breast exam. On exam today I found no evidence of disease. I personally reviewed her recent imaging and pathology and we discussed this.    We calculated her Tyrer Cuzick score, which revealed an estimated lifetime breast cancer risk of 14% and a 10-year breast cancer risk of 2%. (Results will be scanned into the chart.)      Genetic counseling: Completed, negative for pathogenic variants    Further screening:   Mammogram: Next screening mammogram 6/2025  (ordered)  MRI: Next MRI 5/2025 (radiology recommendation) (ordered)    Chemoprophylaxis: n/a    Return to clinic:   1 year for breast exam  See PCP/OB/GYN at least annually for additional breast exam    She was given ample opportunity for questions and those questions were answered to her satisfaction. She has been encouraged to contact the office with any questions or concerns. This encounter lasted a total of 55 minutes, more than 50% of which was dedicated to the discussion of management options.     Rosa Zee MD  Breast Surgical Oncology      CC: Dr. Sarkar            [1] No Known Allergies

## 2024-12-17 NOTE — PATIENT INSTRUCTIONS
Mammogram: Next screening mammogram 6/2025  MRI: Next MRI 5/2025 (radiology recommendation)   Return to clinic in 1 year

## 2025-05-19 ENCOUNTER — HOSPITAL ENCOUNTER (OUTPATIENT)
Dept: MRI IMAGING | Facility: HOSPITAL | Age: 40
Discharge: HOME | End: 2025-05-19
Attending: SURGERY
Payer: COMMERCIAL

## 2025-05-19 DIAGNOSIS — R92.333 HETEROGENEOUSLY DENSE TISSUE OF BOTH BREASTS ON MAMMOGRAPHY: ICD-10-CM

## 2025-05-19 PROCEDURE — A9575 INJ GADOTERATE MEGLUMI 0.1ML: HCPCS | Performed by: SURGERY

## 2025-05-19 PROCEDURE — 77049 MRI BREAST C-+ W/CAD BI: CPT | Performed by: SURGERY

## 2025-05-19 RX ORDER — GADOTERATE MEGLUMINE 376.9 MG/ML
15 INJECTION INTRAVENOUS
Status: COMPLETED | OUTPATIENT
Start: 2025-05-19 | End: 2025-05-19

## 2025-05-19 RX ADMIN — GADOTERATE MEGLUMINE 15 ML: 376.9 INJECTION INTRAVENOUS at 17:53:00

## 2025-06-26 ENCOUNTER — HOSPITAL ENCOUNTER (OUTPATIENT)
Dept: MAMMOGRAPHY | Facility: HOSPITAL | Age: 40
Discharge: HOME | End: 2025-06-26
Attending: SURGERY
Payer: COMMERCIAL

## 2025-06-26 DIAGNOSIS — Z12.31 ENCOUNTER FOR SCREENING MAMMOGRAM FOR MALIGNANT NEOPLASM OF BREAST: ICD-10-CM

## 2025-06-26 DIAGNOSIS — R92.333 HETEROGENEOUSLY DENSE TISSUE OF BOTH BREASTS ON MAMMOGRAPHY: ICD-10-CM

## 2025-06-26 PROCEDURE — 77063 BREAST TOMOSYNTHESIS BI: CPT | Performed by: SURGERY

## 2025-06-26 PROCEDURE — 77067 SCR MAMMO BI INCL CAD: CPT | Performed by: SURGERY

## (undated) NOTE — ED AVS SNAPSHOT
[facility] Emergency Department    [place]  [address] [place] [address]    Phone:  [phone]    Fax:  [phone]           Sheila Webbdemarco   MRN: [id number]    Department:  [facility] Emergency Department   Date of Visit:  6/8 IF THERE IS ANY CHANGE OR WORSENING OF YOUR CONDITION, CALL YOUR PRIMARY CARE PHYSICIAN AT ONCE OR RETURN IMMEDIATELY TO THE EMERGENCY DEPARTMENT.     If you have been prescribed any medication(s), please fill your prescription right away and begin taking t

## (undated) NOTE — ED AVS SNAPSHOT
Hunter Tavera   MRN: [id number]    Department:  [facility] Emergency Department   Date of Visit:  5/5/2018           Disclosure     Insurance plans vary and the physician(s) referred by the ER may not be covered by your plan.  Please contact you tell this physician (or your personal doctor if your instructions are to return to your personal doctor) about any new or lasting problems. The primary care or specialist physician will see patients referred from the [facility] Emergency Department.  Indra Morin